# Patient Record
(demographics unavailable — no encounter records)

---

## 2025-02-10 NOTE — DISCUSSION/SUMMARY
[de-identified] : Chief complaint: Left knee pain  HPI: Patient is a 40-year-old female who presents to the office today for the evaluation of left knee pain.  She reports that 1 week ago she slipped injuring the left knee.  She did not strike the left knee on anything.  For the past 1 week she has had pain to the left knee.  She has pain at rest as well as with ascending and descending stairs.  Denies any significant pain with ambulation.  She does have pain with pivoting and changing direction.  Has been taking Tylenol with questionable relief of her discomfort.  Denies any previous injury or surgery to the left knee.  No reported buckling or locking of the left knee.  ROS: Positive for left knee pain  Physical examination of the LEFT knee shows: there is no appreciable edema No palpable masses No appreciable effusion Able to perform active straight leg raise Knee flexion from 0-110 degrees Tenderness to palpation over medial joint line Positive Wayne's medially No appreciable tibial translation with anterior/posterior drawer No appreciable Instability with varus / valgus stress testing Calves are soft and nontender  Three-view x-rays of the left knee performed in the office today show no obvious acute displaced fracture, subluxation, or dislocation  Assessment/plan: Injury of the left knee, given that the patient has medial joint line tenderness, positive Wayne's medially, and knee x-rays that show well-preserved medial and lateral joint spaces there is concern at this time for a medial meniscal injury, discussed treatment options with the patient  1.  A prescription for diclofenac 50 mg as needed twice daily with food was sent to the patient's pharmacy, confirmed no contraindications to NSAIDS. Patient denies being on a blood thinner. Denies history of GIB or GI ulcer.  Denies chance of active pregnancy.  Discussed in detail with the patient that they cannot take over-the-counter NSAIDs including but not limited to ibuprofen, Advil, Aleve, or Motrin while taking this medication.  They can continue to take over-the-counter Tylenol. 2.  Patient provided with a prescription for formal physical therapy for the left knee so that she can started on that 3.  Recommend that the patient elevate the left lower extremity above pelvis when resting, ice or heat can be applied to the left knee on an as-needed basis with sensory precautions  4.  Discussed the possibility of an intra-articular corticosteroid injection however the patient kindly deferred  Patient will be provided with a 6-week follow-up with me for repeat evaluation, she verbalized understanding of all findings in the office today, agrees to follow-up as directed

## 2025-03-17 NOTE — DISCUSSION/SUMMARY
[de-identified] : At this time Dr. Guillen discussed with the patient that she likely cut her extensor tendon, he is recommending surgery to open it up and repair the tendon.  Patient would like to proceed with surgery.  We are going to work on scheduling the surgery possibly for Thursday.  She was given contact information to the office if she does not hear from anyone by tomorrow. Patient will call me if any other problems or concerns.  Patient verbalized understanding and agreed with the plan, all questions were answered in the office today.

## 2025-03-17 NOTE — IMAGING
[de-identified] : On examination of her right hand she has a healed laceration over the PIP joint of the middle finger.  There is swelling of the PIP joint.  She is tender to palpation over the dorsal aspect of the PIP joint.  She is able to open and close her fist but she does have weakness with extension of the middle finger.   Sensation is intact throughout, 2+ radial pulse.  Dr. Guillen came in and evaluated the patient as well.  X-rays taken at the hospital from 3 days ago reviewed in the office today show no fractures, dislocations, or other bony abnormalities.

## 2025-03-17 NOTE — HISTORY OF PRESENT ILLNESS
[de-identified] : 40-year-old female is here today for evaluation of her right middle finger.  Patient states on March 1, over 2 weeks ago, she was carrying a plate and she slipped and it broke and cut her middle finger.  She states initially she did not go to the hospital or the urgent care, she did not have any sutures placed.  She states the wound closed but then she developed redness and swelling of the finger in her hand.  She states her primary doctor started her on antibiotics which she recently finished.  Then a few days ago she went to the emergency room because she still had some swelling and pain in the finger.  She states they gave her another prescription for an antibiotic but she did not start taking it yet.  She states they recommended that she follow-up here.  She denies any pain to the rest of the hand.  She denies any numbness or tingling.

## 2025-03-26 NOTE — PHYSICAL EXAM
[de-identified] :  physical exam of her right middle finger.  Wound is clean and dry.  No signs of drainage or blood.

## 2025-03-26 NOTE — HISTORY OF PRESENT ILLNESS
[de-identified] : Patient is a 40-year-old female here for a wound check.  She is status post a right middle finger extensor tendon repair the PIP joint from earlier today done by Dr. Guillen.  Postop bandage came off.

## 2025-04-03 NOTE — HISTORY OF PRESENT ILLNESS
[de-identified] : Patient is a 40 year F here for her first PO appt. She is status post a RIGHT MF extensor tendon repair at PIP joint done by Dr. Guillen. She is doing well. She already started OT.

## 2025-04-03 NOTE — DISCUSSION/SUMMARY
[de-identified] : Sutures were removed. Healing can take 4-6 weeks. She has a custom splint made form OT. She will start therapy and see Dr. Guillen in a month for repeat evaluation.  All questions were answered today.

## 2025-04-03 NOTE — PHYSICAL EXAM
[de-identified] : Physical exam of her right hand/wrist: Resolving swelling and ecchymosis. The wound is clean and dry. No signs of drainage, pus, or infection. Sensory and motor are intact.

## 2025-05-05 NOTE — ASSESSMENT
[FreeTextEntry1] : Patient a right middle finger extensor tendon laceration at the level of the central slip.  She underwent surgical repair.  She has done well postoperatively.  She will see me back on a as needed basis.  Any issues or problems contact the office.

## 2025-05-05 NOTE — HISTORY OF PRESENT ILLNESS
[de-identified] : 40-year-old female status post right middle finger extensor tendon repair.  She is doing well.  She did not follow the protocol completely.  Significant complaints.

## 2025-05-05 NOTE — PHYSICAL EXAM
[de-identified] : Patient has intact extensor mechanism.  She has good range of motion of the finger.  Just about full flexion of the fingers.  Normal capillary refill.  No erythema ecchymoses or abrasions.  No extensor lag